# Patient Record
Sex: FEMALE | Race: ASIAN | NOT HISPANIC OR LATINO | URBAN - METROPOLITAN AREA
[De-identification: names, ages, dates, MRNs, and addresses within clinical notes are randomized per-mention and may not be internally consistent; named-entity substitution may affect disease eponyms.]

---

## 2019-05-24 ENCOUNTER — INPATIENT (INPATIENT)
Facility: HOSPITAL | Age: 59
LOS: 0 days | Discharge: HOME | End: 2019-05-25
Attending: INTERNAL MEDICINE | Admitting: INTERNAL MEDICINE
Payer: MEDICAID

## 2019-05-24 VITALS
DIASTOLIC BLOOD PRESSURE: 92 MMHG | HEART RATE: 73 BPM | RESPIRATION RATE: 16 BRPM | OXYGEN SATURATION: 100 % | TEMPERATURE: 98 F | SYSTOLIC BLOOD PRESSURE: 148 MMHG

## 2019-05-24 DIAGNOSIS — R07.9 CHEST PAIN, UNSPECIFIED: ICD-10-CM

## 2019-05-24 DIAGNOSIS — Z98.891 HISTORY OF UTERINE SCAR FROM PREVIOUS SURGERY: Chronic | ICD-10-CM

## 2019-05-24 DIAGNOSIS — Z90.11 ACQUIRED ABSENCE OF RIGHT BREAST AND NIPPLE: Chronic | ICD-10-CM

## 2019-05-24 LAB
ALBUMIN SERPL ELPH-MCNC: 4.8 G/DL — SIGNIFICANT CHANGE UP (ref 3.3–5)
ALP SERPL-CCNC: 52 U/L — SIGNIFICANT CHANGE UP (ref 40–120)
ALT FLD-CCNC: 14 U/L — SIGNIFICANT CHANGE UP (ref 4–33)
ANION GAP SERPL CALC-SCNC: 13 MMO/L — SIGNIFICANT CHANGE UP (ref 7–14)
APTT BLD: 33.5 SEC — SIGNIFICANT CHANGE UP (ref 27.5–36.3)
AST SERPL-CCNC: 30 U/L — SIGNIFICANT CHANGE UP (ref 4–32)
BASOPHILS # BLD AUTO: 0.05 K/UL — SIGNIFICANT CHANGE UP (ref 0–0.2)
BASOPHILS NFR BLD AUTO: 0.9 % — SIGNIFICANT CHANGE UP (ref 0–2)
BILIRUB SERPL-MCNC: < 0.2 MG/DL — LOW (ref 0.2–1.2)
BLD GP AB SCN SERPL QL: NEGATIVE — SIGNIFICANT CHANGE UP
BUN SERPL-MCNC: 14 MG/DL — SIGNIFICANT CHANGE UP (ref 7–23)
CALCIUM SERPL-MCNC: 10.3 MG/DL — SIGNIFICANT CHANGE UP (ref 8.4–10.5)
CHLORIDE SERPL-SCNC: 100 MMOL/L — SIGNIFICANT CHANGE UP (ref 98–107)
CK MB BLD-MCNC: 2.37 NG/ML — SIGNIFICANT CHANGE UP (ref 1–4.7)
CK MB BLD-MCNC: SIGNIFICANT CHANGE UP (ref 0–2.5)
CK SERPL-CCNC: 123 U/L — SIGNIFICANT CHANGE UP (ref 25–170)
CO2 SERPL-SCNC: 26 MMOL/L — SIGNIFICANT CHANGE UP (ref 22–31)
CREAT SERPL-MCNC: 0.65 MG/DL — SIGNIFICANT CHANGE UP (ref 0.5–1.3)
EOSINOPHIL # BLD AUTO: 0.27 K/UL — SIGNIFICANT CHANGE UP (ref 0–0.5)
EOSINOPHIL NFR BLD AUTO: 4.8 % — SIGNIFICANT CHANGE UP (ref 0–6)
GLUCOSE SERPL-MCNC: 91 MG/DL — SIGNIFICANT CHANGE UP (ref 70–99)
HCT VFR BLD CALC: 43.1 % — SIGNIFICANT CHANGE UP (ref 34.5–45)
HGB BLD-MCNC: 14.4 G/DL — SIGNIFICANT CHANGE UP (ref 11.5–15.5)
IMM GRANULOCYTES NFR BLD AUTO: 0 % — SIGNIFICANT CHANGE UP (ref 0–1.5)
INR BLD: 0.95 — SIGNIFICANT CHANGE UP (ref 0.88–1.17)
LYMPHOCYTES # BLD AUTO: 2.22 K/UL — SIGNIFICANT CHANGE UP (ref 1–3.3)
LYMPHOCYTES # BLD AUTO: 39.2 % — SIGNIFICANT CHANGE UP (ref 13–44)
MCHC RBC-ENTMCNC: 30.1 PG — SIGNIFICANT CHANGE UP (ref 27–34)
MCHC RBC-ENTMCNC: 33.4 % — SIGNIFICANT CHANGE UP (ref 32–36)
MCV RBC AUTO: 90.2 FL — SIGNIFICANT CHANGE UP (ref 80–100)
MONOCYTES # BLD AUTO: 0.51 K/UL — SIGNIFICANT CHANGE UP (ref 0–0.9)
MONOCYTES NFR BLD AUTO: 9 % — SIGNIFICANT CHANGE UP (ref 2–14)
NEUTROPHILS # BLD AUTO: 2.61 K/UL — SIGNIFICANT CHANGE UP (ref 1.8–7.4)
NEUTROPHILS NFR BLD AUTO: 46.1 % — SIGNIFICANT CHANGE UP (ref 43–77)
NRBC # FLD: 0 K/UL — SIGNIFICANT CHANGE UP (ref 0–0)
PLATELET # BLD AUTO: 260 K/UL — SIGNIFICANT CHANGE UP (ref 150–400)
PMV BLD: 10.1 FL — SIGNIFICANT CHANGE UP (ref 7–13)
POTASSIUM SERPL-MCNC: 3.9 MMOL/L — SIGNIFICANT CHANGE UP (ref 3.5–5.3)
POTASSIUM SERPL-SCNC: 3.9 MMOL/L — SIGNIFICANT CHANGE UP (ref 3.5–5.3)
PROT SERPL-MCNC: 7.9 G/DL — SIGNIFICANT CHANGE UP (ref 6–8.3)
PROTHROM AB SERPL-ACNC: 10.8 SEC — SIGNIFICANT CHANGE UP (ref 9.8–13.1)
RBC # BLD: 4.78 M/UL — SIGNIFICANT CHANGE UP (ref 3.8–5.2)
RBC # FLD: 12.2 % — SIGNIFICANT CHANGE UP (ref 10.3–14.5)
RH IG SCN BLD-IMP: POSITIVE — SIGNIFICANT CHANGE UP
SODIUM SERPL-SCNC: 139 MMOL/L — SIGNIFICANT CHANGE UP (ref 135–145)
TROPONIN T, HIGH SENSITIVITY: 26 NG/L — SIGNIFICANT CHANGE UP (ref ?–14)
TSH SERPL-MCNC: 4.67 UIU/ML — HIGH (ref 0.27–4.2)
WBC # BLD: 5.66 K/UL — SIGNIFICANT CHANGE UP (ref 3.8–10.5)
WBC # FLD AUTO: 5.66 K/UL — SIGNIFICANT CHANGE UP (ref 3.8–10.5)

## 2019-05-24 PROCEDURE — 99152 MOD SED SAME PHYS/QHP 5/>YRS: CPT

## 2019-05-24 PROCEDURE — 93458 L HRT ARTERY/VENTRICLE ANGIO: CPT | Mod: 26

## 2019-05-24 PROCEDURE — 71046 X-RAY EXAM CHEST 2 VIEWS: CPT | Mod: 26

## 2019-05-24 RX ORDER — LEVOTHYROXINE SODIUM 125 MCG
75 TABLET ORAL DAILY
Refills: 0 | Status: DISCONTINUED | OUTPATIENT
Start: 2019-05-24 | End: 2019-05-25

## 2019-05-24 RX ORDER — ONDANSETRON 8 MG/1
4 TABLET, FILM COATED ORAL ONCE
Refills: 0 | Status: DISCONTINUED | OUTPATIENT
Start: 2019-05-24 | End: 2019-05-25

## 2019-05-24 RX ORDER — ASPIRIN/CALCIUM CARB/MAGNESIUM 324 MG
324 TABLET ORAL ONCE
Refills: 0 | Status: COMPLETED | OUTPATIENT
Start: 2019-05-24 | End: 2019-05-24

## 2019-05-24 RX ORDER — METOPROLOL TARTRATE 50 MG
12.5 TABLET ORAL
Refills: 0 | Status: DISCONTINUED | OUTPATIENT
Start: 2019-05-24 | End: 2019-05-25

## 2019-05-24 RX ADMIN — Medication 324 MILLIGRAM(S): at 17:20

## 2019-05-24 NOTE — H&P CARDIOLOGY - HISTORY OF PRESENT ILLNESS
59 year old Pashto speaking female with fmaily hisory of CAD with breast CA with chemotherapy 5yrs ago, osteoporosis who to her PMD complaining of increasing indigestion over the past 4months.  Patient describes her symptoms as epigastric/substernal tightness and palpitations at rest, relieved spontaneously. Vague complaints of SOB, unable to elaborate. Denies dizziness, syncope, edema, orthopnea, PND. Underwent an EKG with reported Twave inversions and directed to [facility] ED.   In light of patients cardiac risk factors, symptoms and abnormal noninvasive test findings there is high suspicion for CAD. Patient is now referred to [facility] for a cardiac catheterization with possible PTCA/stent.

## 2019-05-24 NOTE — ED PROVIDER NOTE - ATTENDING CONTRIBUTION TO CARE
Dr. Lou: I have personally seen and examined this patient at the bedside. I have fully participated in the care of this patient. I have reviewed all pertinent clinical information, including history, physical exam, plan and the Resident's note and agree except as noted. HPI above as by me. PE above as by me. DDX unstable angina PLAN asa, pre ops, discuss with interventional cardiology.

## 2019-05-24 NOTE — H&P CARDIOLOGY - FAMILY HISTORY
Sibling  Still living? Yes, Estimated age: Age Unknown  Family history of heart attack, Age at diagnosis: Age Unknown     Father  Still living? Unknown  Family history of stroke, Age at diagnosis: Age Unknown

## 2019-05-24 NOTE — ED PROVIDER NOTE - OBJECTIVE STATEMENT
17:10 Dasha att: 59F c/o cp x 1 day, sent by PMD for abnl ekg.     IMM PMH PSH MED ALL SMOKE PCP PHARMACY 17:10 Dasha att: 59F c/o cp x 1 day, sent by PMD for abnl ekg.     IMM PMH PSH MED ALL SMOKE PCP PHARMACY\    Lynchy: 60yo F w/ pmhx of breast s/o chemo 5 yrs ago, 17:10 Dasha att: 59F c/o cp x 1 day, sent by PMD for abnl ekg.     IMM PMH PSH MED ALL SMOKE PCP PHARMACY\    Lynchy: 58yo F w/ pmhx of breast s/o chemo 5 yrs ago, c/o 6 months of intermittent chest pain, associated with numbness and tingling to the hands, nausea but no vomiting. Denies any fever, chills, leg swelling, recent travel, hx of DVT, or any other symptoms. Pt went to PMD today and was sent here for abnormal EKG. 17:10 Lou att: 59F c/o cp x 1 day, sent by PMD for abnl ekg. Patient reports past 6 months every morning indigestion, nausea, and dizziness. Today felt symptoms more severe. Called PCP, went to PCP office and got an EKG showing t wave inversions inferiorly. Daughter an MD spoke with Dr. Fuad Tomas who spoke with Dr. Nupur Anthony who arranged cardiac cath for today. Patient denies active cp, no prior cad, last stress/echo 5 yrs ago. PMH breast cancer      Lynchy: 58yo F w/ pmhx of breast s/o chemo 5 yrs ago, c/o 6 months of intermittent chest pain, associated with numbness and tingling to the hands, nausea but no vomiting. Denies any fever, chills, leg swelling, recent travel, hx of DVT, or any other symptoms. Pt went to PMD today and was sent here for abnormal EKG.

## 2019-05-24 NOTE — ED PROVIDER NOTE - CRITICAL CARE PROVIDED
direct patient care (not related to procedure)/additional history taking/consultation with other physicians/consult w/ pt's family directly relating to pts condition

## 2019-05-24 NOTE — CHART NOTE - NSCHARTNOTEFT_GEN_A_CORE
JAYLA RODRÍGUEZ  [id number] 59y    Patient status post cath via RFA. Dressing clean/dry/intact. Without hematoma. Pulses present. Patient resting comfortable without complaint. Will continue to  follow closely.    Vital Signs Last 24 Hrs  T(C): 36.4 (24 May 2019 20:14), Max: 36.5 (24 May 2019 15:59)  T(F): 97.6 (24 May 2019 20:14), Max: 97.7 (24 May 2019 15:59)  HR: 69 (24 May 2019 20:14) (69 - 82)  BP: 141/80 (24 May 2019 20:14) (141/80 - 148/92)  BP(mean): --  RR: 16 (24 May 2019 20:14) (16 - 20)  SpO2: 97% (24 May 2019 20:14) (97% - 100%)

## 2019-05-24 NOTE — PATIENT PROFILE ADULT - HAS THE PATIENT BEEN ADMITTED FROM SHORT TERM REHAB?
----- Message from Catherine Dobson sent at 11/6/2017 10:53 AM CST -----  Contact: Patient  Patient is returning phone call; call placed to pod, no answer.  Call Back#[phone]  Thanks    no

## 2019-05-24 NOTE — ED ADULT TRIAGE NOTE - CHIEF COMPLAINT QUOTE
Pt c/o  intermittent chest pain, "indigestion", nausea  since this am. Denies sob.  Pt sent by PCP with abnormal EKG

## 2019-05-24 NOTE — ED PROVIDER NOTE - CLINICAL SUMMARY MEDICAL DECISION MAKING FREE TEXT BOX
58yo F w/ pmhx of breast s/o chemo 5 yrs ago, c/o 6 months of intermittent chest pain, associated with numbness and tingling to the hands, nausea but no vomiting. Denies any fever, chills, leg swelling, recent travel, hx of DVT, or any other symptoms. Pt went to PMD today and was sent here for abnormal EKG. PE is unremarkable, r/o Angina vs NSTEMI

## 2019-05-24 NOTE — ED ADULT NURSE NOTE - OBJECTIVE STATEMENT
60 yo female, ambulatory, presenting to ed after abnormal ekg reading at pcp office. as per family, pt sent to ED for possible cath. pt c/o intermittent indigestion to mid abdominal region for 6 months. pt also experiencing palpitation intermittently for 6 months. pt had ekg last year at pcp, and changes noted to new ekg. pt states she awakes every morning with nausea and dizziness. upon assessment, pt denies active chest pain, sob, dizzziness, n/v/d. pt denies difficulty urinating, or with BMs. no recent fever or illness. pt medicated w aspirin as ordered. pt on CM. 22g iv to left hand. pink arm band applied to right wrist s/p right side mastectomy several years ago

## 2019-05-25 ENCOUNTER — TRANSCRIPTION ENCOUNTER (OUTPATIENT)
Age: 59
End: 2019-05-25

## 2019-05-25 VITALS
RESPIRATION RATE: 18 BRPM | TEMPERATURE: 98 F | SYSTOLIC BLOOD PRESSURE: 113 MMHG | HEART RATE: 67 BPM | OXYGEN SATURATION: 99 % | DIASTOLIC BLOOD PRESSURE: 77 MMHG

## 2019-05-25 DIAGNOSIS — C50.919 MALIGNANT NEOPLASM OF UNSPECIFIED SITE OF UNSPECIFIED FEMALE BREAST: ICD-10-CM

## 2019-05-25 DIAGNOSIS — R07.2 PRECORDIAL PAIN: ICD-10-CM

## 2019-05-25 LAB
ANION GAP SERPL CALC-SCNC: 12 MMO/L — SIGNIFICANT CHANGE UP (ref 7–14)
BUN SERPL-MCNC: 13 MG/DL — SIGNIFICANT CHANGE UP (ref 7–23)
CALCIUM SERPL-MCNC: 9.4 MG/DL — SIGNIFICANT CHANGE UP (ref 8.4–10.5)
CHLORIDE SERPL-SCNC: 103 MMOL/L — SIGNIFICANT CHANGE UP (ref 98–107)
CO2 SERPL-SCNC: 25 MMOL/L — SIGNIFICANT CHANGE UP (ref 22–31)
CREAT SERPL-MCNC: 0.58 MG/DL — SIGNIFICANT CHANGE UP (ref 0.5–1.3)
GLUCOSE SERPL-MCNC: 102 MG/DL — HIGH (ref 70–99)
HCT VFR BLD CALC: 41 % — SIGNIFICANT CHANGE UP (ref 34.5–45)
HGB BLD-MCNC: 13.5 G/DL — SIGNIFICANT CHANGE UP (ref 11.5–15.5)
MAGNESIUM SERPL-MCNC: 2.2 MG/DL — SIGNIFICANT CHANGE UP (ref 1.6–2.6)
MCHC RBC-ENTMCNC: 29.7 PG — SIGNIFICANT CHANGE UP (ref 27–34)
MCHC RBC-ENTMCNC: 32.9 % — SIGNIFICANT CHANGE UP (ref 32–36)
MCV RBC AUTO: 90.3 FL — SIGNIFICANT CHANGE UP (ref 80–100)
NRBC # FLD: 0 K/UL — SIGNIFICANT CHANGE UP (ref 0–0)
PHOSPHATE SERPL-MCNC: 4.5 MG/DL — SIGNIFICANT CHANGE UP (ref 2.5–4.5)
PLATELET # BLD AUTO: 233 K/UL — SIGNIFICANT CHANGE UP (ref 150–400)
PMV BLD: 10.3 FL — SIGNIFICANT CHANGE UP (ref 7–13)
POTASSIUM SERPL-MCNC: 4 MMOL/L — SIGNIFICANT CHANGE UP (ref 3.5–5.3)
POTASSIUM SERPL-SCNC: 4 MMOL/L — SIGNIFICANT CHANGE UP (ref 3.5–5.3)
RBC # BLD: 4.54 M/UL — SIGNIFICANT CHANGE UP (ref 3.8–5.2)
RBC # FLD: 12.5 % — SIGNIFICANT CHANGE UP (ref 10.3–14.5)
SODIUM SERPL-SCNC: 140 MMOL/L — SIGNIFICANT CHANGE UP (ref 135–145)
WBC # BLD: 5.37 K/UL — SIGNIFICANT CHANGE UP (ref 3.8–10.5)
WBC # FLD AUTO: 5.37 K/UL — SIGNIFICANT CHANGE UP (ref 3.8–10.5)

## 2019-05-25 RX ORDER — METOPROLOL TARTRATE 50 MG
25 TABLET ORAL DAILY
Refills: 0 | Status: DISCONTINUED | OUTPATIENT
Start: 2019-05-25 | End: 2019-05-25

## 2019-05-25 RX ORDER — LISINOPRIL 2.5 MG/1
1 TABLET ORAL
Qty: 30 | Refills: 0
Start: 2019-05-25 | End: 2019-06-23

## 2019-05-25 RX ORDER — LISINOPRIL 2.5 MG/1
2.5 TABLET ORAL DAILY
Refills: 0 | Status: DISCONTINUED | OUTPATIENT
Start: 2019-05-25 | End: 2019-05-25

## 2019-05-25 RX ORDER — METOPROLOL TARTRATE 50 MG
1 TABLET ORAL
Qty: 30 | Refills: 0
Start: 2019-05-25 | End: 2019-06-23

## 2019-05-25 RX ADMIN — Medication 75 MICROGRAM(S): at 05:42

## 2019-05-25 RX ADMIN — Medication 12.5 MILLIGRAM(S): at 05:42

## 2019-05-25 NOTE — PROGRESS NOTE ADULT - ATTENDING COMMENTS
Patient was seen and examined,interim events noted,labs and radiology studies reviewed.  Fuad Tomas MD,FACC.  [address] [place] [address].  [facility].  [phone]

## 2019-05-25 NOTE — DISCHARGE NOTE PROVIDER - NSDCCPTREATMENT_GEN_ALL_CORE_FT
PRINCIPAL PROCEDURE  Procedure: Left heart cardiac catheterization  Findings and Treatment: Normal coronary arteries. EF 50%.

## 2019-05-25 NOTE — DISCHARGE NOTE NURSING/CASE MANAGEMENT/SOCIAL WORK - NSDCDPATPORTLINK_GEN_ALL_CORE
You can access the [facility] Patient Portal, offered by [facility], by registering with the following website: [email or web address]

## 2019-05-25 NOTE — DISCHARGE NOTE PROVIDER - HOSPITAL COURSE
60 y/o female with a PMHx of breast cancer presents to ED with epigastric pain. EKG showed NSR with new T wave inversions. Troponin level 26. Pt underwent cardiac cath which revealed normal coronary arteries. EF 50%. Case discussed with Dr. Tomas, pt medically stable for discharge home.

## 2019-05-25 NOTE — DISCHARGE NOTE PROVIDER - CARE PROVIDER_API CALL
Fuad Tomas)  Medicine  Dept Director  [address] [place], [place] [address]  Phone: [phone]  Fax: [phone]  Follow Up Time:

## 2019-05-25 NOTE — DISCHARGE NOTE PROVIDER - NSDCCPCAREPLAN_GEN_ALL_CORE_FT
PRINCIPAL DISCHARGE DIAGNOSIS  Diagnosis: Chest pain  Assessment and Plan of Treatment: Your cardiac cath was normal. There were no blockages in your heart. Follow up with your cardiologist within one week of discharge.

## 2019-05-25 NOTE — PROGRESS NOTE ADULT - PROBLEM SELECTOR PLAN 1
Chest pain with abnormal ECG.  Non obstructive CAD EF-50%  Apical akinesis-Hx ChemoRx?chemo induced.  Continue BBlocker low dose ACEI.  Discharge home today

## 2019-05-25 NOTE — PROGRESS NOTE ADULT - SUBJECTIVE AND OBJECTIVE BOX
PRESENTING CC:Chest Pain    SUBJ: 59 year old Maori speaking female with fmaily hisory of CAD with breast CA with chemotherapy 5yrs ago, osteoporosis who to her PMD complaining of increasing indigestion over the past 4months.Underwent an EKG with new T Wave abnormalities underwent Cath-Non obstructive CAD with apical akinesis preserved EF-remains chest pain free no overnight events noted.    PMH -reviewed admission note, no change since admission  Heart failure: acute [ ] chronic [ ] acute or chronic [ ] diastolic [ ] systolic [ ] combined systolic and diastolic[ ]  JAN: ATN[ ] renal medullary necrosis [ ] CKD I [ ]CKDII [ ]CKD III [ ]CKD IV [ ]CKD V [ ]Other pathological lesions [ ]    MEDICATIONS  (STANDING):  levothyroxine 75 MICROGram(s) Oral daily  metoprolol tartrate 12.5 milliGRAM(s) Oral two times a day  ondansetron Injectable 4 milliGRAM(s) IV Push once      FAMILY HISTORY:  Family history of stroke (Father): father  MI 60yo  Family history of heart attack (Sibling): sister at 59yo    REVIEW OF SYSTEMS:  Constitutional: [ ] fever, [ ]weight loss,  [ ]fatigue  Eyes: [ ] visual changes  Respiratory: [ ]shortness of breath;  [ ] cough, [ ]wheezing, [ ]chills, [ ]hemoptysis  Cardiovascular: [x ] chest pain, [ ]palpitations, [ ]dizziness,  [ ]leg swelling[ ]orthopnea[ ]PND  Gastrointestinal: [ ] abdominal pain, [ ]nausea, [ ]vomiting,  [ ]diarrhea   Genitourinary: [ ] dysuria, [ ] hematuria  Neurologic: [ ] headaches [ ] tremors[ ]weakness  Skin: [ ] itching, [ ]burning, [ ] rashes  Endocrine: [ ] heat or cold intolerance  Musculoskeletal: [ ] joint pain or swelling; [ ] muscle, back, or extremity pain  Psychiatric: [ ] depression, [ ]anxiety, [ ]mood swings, or [ ]difficulty sleeping  Hematologic: [ ] easy bruising, [ ] bleeding gums    [x] All remaining systems negative except as per above.   [ ]Unable to obtain.    Vital Signs Last 24 Hrs  T(C): 36.4 (25 May 2019 05:40), Max: 36.5 (24 May 2019 15:59)  T(F): 97.6 (25 May 2019 05:40), Max: 97.7 (24 May 2019 15:59)  HR: 67 (25 May 2019 05:40) (67 - 82)  BP: 110/61 (25 May 2019 05:40) (110/61 - 148/92)  RR: 16 (25 May 2019 05:40) (16 - 20)  SpO2: 99% (25 May 2019 05:40) (97% - 100%)  I&O's Summary      PHYSICAL EXAM:  General: No acute distress BMI-18.8  HEENT: EOMI, PERRL  Neck: Supple, [ ] JVD Right Mastectomy  Lungs: Equal air entry bilaterally; [ ] rales [ ] wheezing [ ] rhonchi  Heart: Regular rate and rhythm; [x ] murmur  2 /6 [x ] systolic [ ] diastolic [ ] radiation[ ] rubs [ ]  gallops  Abdomen: Nontender, bowel sounds present  Extremities: No clubbing, cyanosis, [ ] edema  Nervous system:  Alert & Oriented X3, no focal deficits  Psychiatric: Normal affect  Skin: No rashes or lesions    LABS:      139  |  100  |  14  ----------------------------<  91  3.9   |  26  |  0.65    Ca    10.3      24 May 2019 17:06    TPro  7.9  /  Alb  4.8  /  TBili  < 0.2<L>  /  DBili  x   /  AST  30  /  ALT  14  /  AlkPhos  52      Creatinine Trend: 0.65<--                        14.4   5.66  )-----------( 260      ( 24 May 2019 17:06 )             43.1     PT/INR - ( 24 May 2019 17:06 )   PT: 10.8 SEC;   INR: 0.95          PTT - ( 24 May 2019 17:06 )  PTT:33.5 SEC    Cardiac Enzymes: CARDIAC MARKERS ( 24 May 2019 17:06 )  x     / x     / 123 u/L / 2.37 ng/mL / x    ECG [my interpretation]:Sinus rhythm anterolateral T wave abnormality    TELEMETRY:Sinus rhythm no arrhythmias    CATH:-Mold non obstructive CAD EF 50% Apical akinesis    IMPRESSION AND PLAN:

## 2019-06-06 ENCOUNTER — APPOINTMENT (OUTPATIENT)
Dept: CARDIOLOGY | Facility: CLINIC | Age: 59
End: 2019-06-06
Payer: MEDICAID

## 2019-06-06 VITALS — HEART RATE: 82 BPM | SYSTOLIC BLOOD PRESSURE: 121 MMHG | RESPIRATION RATE: 16 BRPM | DIASTOLIC BLOOD PRESSURE: 80 MMHG

## 2019-06-06 DIAGNOSIS — R94.31 ABNORMAL ELECTROCARDIOGRAM [ECG] [EKG]: ICD-10-CM

## 2019-06-06 DIAGNOSIS — I10 ESSENTIAL (PRIMARY) HYPERTENSION: ICD-10-CM

## 2019-06-06 PROCEDURE — 99204 OFFICE O/P NEW MOD 45 MIN: CPT

## 2019-06-06 RX ORDER — DOCUSATE SODIUM 100 MG/1
100 CAPSULE ORAL
Qty: 90 | Refills: 0 | Status: ACTIVE | COMMUNITY
Start: 2019-05-28

## 2019-06-06 RX ORDER — CALCIUM CARBONATE/VITAMIN D3 500 MG-2.5
500-100 TABLET,CHEWABLE ORAL
Qty: 60 | Refills: 0 | Status: ACTIVE | COMMUNITY
Start: 2019-05-24

## 2019-06-06 RX ORDER — METOPROLOL SUCCINATE 25 MG/1
25 TABLET, EXTENDED RELEASE ORAL
Qty: 30 | Refills: 0 | Status: DISCONTINUED | COMMUNITY
Start: 2019-05-25 | End: 2019-06-06

## 2019-06-06 RX ORDER — DENOSUMAB 60 MG/ML
60 INJECTION SUBCUTANEOUS
Qty: 1 | Refills: 0 | Status: ACTIVE | COMMUNITY
Start: 2019-06-05

## 2019-06-06 RX ORDER — LISINOPRIL 2.5 MG/1
2.5 TABLET ORAL
Qty: 30 | Refills: 0 | Status: DISCONTINUED | COMMUNITY
Start: 2019-05-25 | End: 2019-06-06

## 2019-06-06 RX ORDER — CYCLOSPORINE 0.5 MG/ML
0.05 EMULSION OPHTHALMIC
Qty: 60 | Refills: 0 | Status: ACTIVE | COMMUNITY
Start: 2019-05-23

## 2019-06-06 RX ORDER — LEVOTHYROXINE SODIUM 75 UG/1
75 TABLET ORAL
Qty: 30 | Refills: 0 | Status: ACTIVE | COMMUNITY
Start: 2019-05-01

## 2019-06-06 NOTE — DISCUSSION/SUMMARY
[FreeTextEntry1] : 59 F hypothyroidism with CP and wall motion abnormality on cath.\par CHECK ECHO.\par Continue synthroid.\par STOP LISINOPRIL.\par Change metoprolol XL to metoprolol tartrate 12.5 BID. Patient reports SE with Toprol XL.

## 2019-06-06 NOTE — PHYSICAL EXAM
[General Appearance - Well Developed] : well developed [Normal Appearance] : normal appearance [General Appearance - Well Nourished] : well nourished [Well Groomed] : well groomed [No Deformities] : no deformities [Eyelids - No Xanthelasma] : the eyelids demonstrated no xanthelasmas [Normal Conjunctiva] : the conjunctiva exhibited no abnormalities [General Appearance - In No Acute Distress] : no acute distress [Normal Oral Mucosa] : normal oral mucosa [No Oral Pallor] : no oral pallor [Normal Jugular Venous V Waves Present] : normal jugular venous V waves present [No Oral Cyanosis] : no oral cyanosis [Normal Jugular Venous A Waves Present] : normal jugular venous A waves present [Heart Rate And Rhythm] : heart rate and rhythm were normal [No Jugular Venous Rousseau A Waves] : no jugular venous rousseau A waves [Heart Sounds] : normal S1 and S2 [Murmurs] : no murmurs present [Respiration, Rhythm And Depth] : normal respiratory rhythm and effort [Exaggerated Use Of Accessory Muscles For Inspiration] : no accessory muscle use [Auscultation Breath Sounds / Voice Sounds] : lungs were clear to auscultation bilaterally [Abdomen Tenderness] : non-tender [Abdomen Soft] : soft [Abdomen Mass (___ Cm)] : no abdominal mass palpated [Cyanosis, Localized] : no localized cyanosis [Nail Clubbing] : no clubbing of the fingernails [Petechial Hemorrhages (___cm)] : no petechial hemorrhages [Skin Color & Pigmentation] : normal skin color and pigmentation [Skin Lesions] : no skin lesions [No Venous Stasis] : no venous stasis [] : no rash [No Xanthoma] : no  xanthoma was observed [Oriented To Time, Place, And Person] : oriented to person, place, and time [No Skin Ulcers] : no skin ulcer [Affect] : the affect was normal [Mood] : the mood was normal [No Anxiety] : not feeling anxious

## 2019-06-06 NOTE — REASON FOR VISIT
[Initial Evaluation] : an initial evaluation of [Chest Pain] : chest pain [Hypertension] : hypertension [FreeTextEntry1] : 59 F hypothyroidism hx of breast CA with CP.

## 2019-06-06 NOTE — HISTORY OF PRESENT ILLNESS
[FreeTextEntry1] : 1. Hypothyroidism on synthroid.\par 2. Patient recently was found to have an abnormal EKG (Tw inversions in the anterior leads) with CP. CP is non-exertional, without radiation, lasting minutes and increasing in severity but relieved on its own and non-exertional in nature. The CP occurs several times/ week.\par She denies SOB, leg edema or any other symptoms. ET is preserved.

## 2019-06-20 ENCOUNTER — APPOINTMENT (OUTPATIENT)
Dept: CARDIOLOGY | Facility: CLINIC | Age: 59
End: 2019-06-20
Payer: MEDICAID

## 2019-06-20 VITALS
BODY MASS INDEX: 18.25 KG/M2 | RESPIRATION RATE: 15 BRPM | WEIGHT: 103 LBS | SYSTOLIC BLOOD PRESSURE: 100 MMHG | DIASTOLIC BLOOD PRESSURE: 62 MMHG | HEART RATE: 75 BPM | HEIGHT: 63 IN

## 2019-06-20 PROCEDURE — 99214 OFFICE O/P EST MOD 30 MIN: CPT

## 2019-06-20 NOTE — REASON FOR VISIT
[Follow-Up - Clinic] : a clinic follow-up of [Chest Pain] : chest pain [Palpitations] : palpitations [FreeTextEntry1] : 59 F hypothyroidism hx of breast CA with CP. \par

## 2019-06-20 NOTE — HISTORY OF PRESENT ILLNESS
[FreeTextEntry1] : 1. Hypothyroidism on synthroid.\par 2. \par She underwent cardiac cath that showed normal coronaries but was told she had hypokinesis on the LV gram. Echo shows normal wall motion.\par She reports improvement in her CP and SOB. She has had intermittent palpitations on mornings only. She is on metoprolol 12.5 BID. \par

## 2019-06-20 NOTE — PHYSICAL EXAM
[Normal Appearance] : normal appearance [General Appearance - Well Developed] : well developed [Well Groomed] : well groomed [No Deformities] : no deformities [General Appearance - Well Nourished] : well nourished [General Appearance - In No Acute Distress] : no acute distress [Normal Conjunctiva] : the conjunctiva exhibited no abnormalities [Eyelids - No Xanthelasma] : the eyelids demonstrated no xanthelasmas [No Oral Pallor] : no oral pallor [Normal Oral Mucosa] : normal oral mucosa [No Oral Cyanosis] : no oral cyanosis [Normal Jugular Venous A Waves Present] : normal jugular venous A waves present [Normal Jugular Venous V Waves Present] : normal jugular venous V waves present [No Jugular Venous Rousseau A Waves] : no jugular venous rousseau A waves [Respiration, Rhythm And Depth] : normal respiratory rhythm and effort [Exaggerated Use Of Accessory Muscles For Inspiration] : no accessory muscle use [Auscultation Breath Sounds / Voice Sounds] : lungs were clear to auscultation bilaterally [Heart Rate And Rhythm] : heart rate and rhythm were normal [Heart Sounds] : normal S1 and S2 [Murmurs] : no murmurs present [Abdomen Soft] : soft [Abdomen Tenderness] : non-tender [Abdomen Mass (___ Cm)] : no abdominal mass palpated [Nail Clubbing] : no clubbing of the fingernails [Cyanosis, Localized] : no localized cyanosis [Petechial Hemorrhages (___cm)] : no petechial hemorrhages [Skin Color & Pigmentation] : normal skin color and pigmentation [] : no rash [No Venous Stasis] : no venous stasis [Skin Lesions] : no skin lesions [No Skin Ulcers] : no skin ulcer [No Xanthoma] : no  xanthoma was observed [Oriented To Time, Place, And Person] : oriented to person, place, and time [Affect] : the affect was normal [Mood] : the mood was normal [No Anxiety] : not feeling anxious

## 2019-06-20 NOTE — DISCUSSION/SUMMARY
[FreeTextEntry1] : 59 F for f/u of CP and abnormal EKG.\par Normal wall motion on echo.\par No evidence of CAD.\par Reassurance and f/u with palpitations continue for holter.

## 2019-07-25 ENCOUNTER — APPOINTMENT (OUTPATIENT)
Dept: CARDIOLOGY | Facility: CLINIC | Age: 59
End: 2019-07-25
Payer: MEDICAID

## 2019-07-25 VITALS
BODY MASS INDEX: 18.07 KG/M2 | SYSTOLIC BLOOD PRESSURE: 103 MMHG | HEIGHT: 63 IN | RESPIRATION RATE: 12 BRPM | WEIGHT: 102 LBS | HEART RATE: 67 BPM | DIASTOLIC BLOOD PRESSURE: 80 MMHG

## 2019-07-25 PROCEDURE — 99213 OFFICE O/P EST LOW 20 MIN: CPT

## 2019-07-25 RX ORDER — METOPROLOL TARTRATE 25 MG/1
25 TABLET, FILM COATED ORAL
Qty: 60 | Refills: 5 | Status: ACTIVE | COMMUNITY
Start: 2019-06-06 | End: 1900-01-01

## 2019-07-25 NOTE — HISTORY OF PRESENT ILLNESS
[FreeTextEntry1] : 1. Hypothyroidism on synthroid. No palpitations noted. \par 2. \par She underwent cardiac cath that showed normal coronaries but was told she had hypokinesis on the LV gram. Echo shows normal wall motion.\par \par Her symptoms have improved on BB. ET is stable.

## 2019-07-25 NOTE — REASON FOR VISIT
[Follow-Up - Clinic] : a clinic follow-up of [FreeTextEntry1] : 59 F hypothyroidism hx of breast CA with CP. \par

## 2019-07-25 NOTE — DISCUSSION/SUMMARY
[FreeTextEntry1] : 59 F hypothyroidism abnormal EKG for f/u.\par Patient asymptomatic. \par last echo normal.\par Continue meds for hypothyroidism.

## 2019-07-25 NOTE — PHYSICAL EXAM
[General Appearance - Well Developed] : well developed [Normal Appearance] : normal appearance [Well Groomed] : well groomed [General Appearance - Well Nourished] : well nourished [No Deformities] : no deformities [General Appearance - In No Acute Distress] : no acute distress [Normal Conjunctiva] : the conjunctiva exhibited no abnormalities [Eyelids - No Xanthelasma] : the eyelids demonstrated no xanthelasmas [Normal Oral Mucosa] : normal oral mucosa [No Oral Pallor] : no oral pallor [No Oral Cyanosis] : no oral cyanosis [Normal Jugular Venous A Waves Present] : normal jugular venous A waves present [Normal Jugular Venous V Waves Present] : normal jugular venous V waves present [No Jugular Venous Rousseau A Waves] : no jugular venous rousseau A waves [Respiration, Rhythm And Depth] : normal respiratory rhythm and effort [Exaggerated Use Of Accessory Muscles For Inspiration] : no accessory muscle use [Auscultation Breath Sounds / Voice Sounds] : lungs were clear to auscultation bilaterally [Heart Rate And Rhythm] : heart rate and rhythm were normal [Heart Sounds] : normal S1 and S2 [Murmurs] : no murmurs present [Abdomen Soft] : soft [Abdomen Tenderness] : non-tender [Abdomen Mass (___ Cm)] : no abdominal mass palpated [Nail Clubbing] : no clubbing of the fingernails [Cyanosis, Localized] : no localized cyanosis [Petechial Hemorrhages (___cm)] : no petechial hemorrhages [Skin Color & Pigmentation] : normal skin color and pigmentation [] : no rash [No Venous Stasis] : no venous stasis [Skin Lesions] : no skin lesions [No Skin Ulcers] : no skin ulcer [No Xanthoma] : no  xanthoma was observed [Oriented To Time, Place, And Person] : oriented to person, place, and time [Affect] : the affect was normal [Mood] : the mood was normal [No Anxiety] : not feeling anxious

## 2019-09-04 ENCOUNTER — APPOINTMENT (OUTPATIENT)
Dept: OBGYN | Facility: CLINIC | Age: 59
End: 2019-09-04

## 2019-11-21 ENCOUNTER — APPOINTMENT (OUTPATIENT)
Dept: CARDIOLOGY | Facility: CLINIC | Age: 59
End: 2019-11-21

## 2021-07-10 ENCOUNTER — TRANSCRIPTION ENCOUNTER (OUTPATIENT)
Age: 61
End: 2021-07-10

## 2022-04-07 ENCOUNTER — EMERGENCY (EMERGENCY)
Facility: HOSPITAL | Age: 62
LOS: 1 days | Discharge: HOME | End: 2022-04-07
Attending: EMERGENCY MEDICINE | Admitting: EMERGENCY MEDICINE
Payer: MEDICAID

## 2022-04-07 VITALS
OXYGEN SATURATION: 100 % | DIASTOLIC BLOOD PRESSURE: 86 MMHG | SYSTOLIC BLOOD PRESSURE: 133 MMHG | RESPIRATION RATE: 16 BRPM | HEART RATE: 75 BPM | HEIGHT: 63 IN | TEMPERATURE: 99 F

## 2022-04-07 DIAGNOSIS — Z98.891 HISTORY OF UTERINE SCAR FROM PREVIOUS SURGERY: Chronic | ICD-10-CM

## 2022-04-07 DIAGNOSIS — Z90.11 ACQUIRED ABSENCE OF RIGHT BREAST AND NIPPLE: Chronic | ICD-10-CM

## 2022-04-07 LAB
BASOPHILS # BLD AUTO: 0.03 K/UL — SIGNIFICANT CHANGE UP (ref 0–0.2)
BASOPHILS NFR BLD AUTO: 0.7 % — SIGNIFICANT CHANGE UP (ref 0–2)
EOSINOPHIL # BLD AUTO: 0.27 K/UL — SIGNIFICANT CHANGE UP (ref 0–0.5)
EOSINOPHIL NFR BLD AUTO: 6.1 % — HIGH (ref 0–6)
HCT VFR BLD CALC: 39.2 % — SIGNIFICANT CHANGE UP (ref 34.5–45)
HGB BLD-MCNC: 13.1 G/DL — SIGNIFICANT CHANGE UP (ref 11.5–15.5)
IANC: 1.83 K/UL — SIGNIFICANT CHANGE UP (ref 1.8–7.4)
IMM GRANULOCYTES NFR BLD AUTO: 0.2 % — SIGNIFICANT CHANGE UP (ref 0–1.5)
LYMPHOCYTES # BLD AUTO: 1.91 K/UL — SIGNIFICANT CHANGE UP (ref 1–3.3)
LYMPHOCYTES # BLD AUTO: 42.8 % — SIGNIFICANT CHANGE UP (ref 13–44)
MCHC RBC-ENTMCNC: 30.7 PG — SIGNIFICANT CHANGE UP (ref 27–34)
MCHC RBC-ENTMCNC: 33.4 GM/DL — SIGNIFICANT CHANGE UP (ref 32–36)
MCV RBC AUTO: 91.8 FL — SIGNIFICANT CHANGE UP (ref 80–100)
MONOCYTES # BLD AUTO: 0.41 K/UL — SIGNIFICANT CHANGE UP (ref 0–0.9)
MONOCYTES NFR BLD AUTO: 9.2 % — SIGNIFICANT CHANGE UP (ref 2–14)
NEUTROPHILS # BLD AUTO: 1.83 K/UL — SIGNIFICANT CHANGE UP (ref 1.8–7.4)
NEUTROPHILS NFR BLD AUTO: 41 % — LOW (ref 43–77)
NRBC # BLD: 0 /100 WBCS — SIGNIFICANT CHANGE UP
NRBC # FLD: 0 K/UL — SIGNIFICANT CHANGE UP
PLATELET # BLD AUTO: 231 K/UL — SIGNIFICANT CHANGE UP (ref 150–400)
RBC # BLD: 4.27 M/UL — SIGNIFICANT CHANGE UP (ref 3.8–5.2)
RBC # FLD: 12.9 % — SIGNIFICANT CHANGE UP (ref 10.3–14.5)
WBC # BLD: 4.46 K/UL — SIGNIFICANT CHANGE UP (ref 3.8–10.5)
WBC # FLD AUTO: 4.46 K/UL — SIGNIFICANT CHANGE UP (ref 3.8–10.5)

## 2022-04-07 PROCEDURE — 71046 X-RAY EXAM CHEST 2 VIEWS: CPT | Mod: 26

## 2022-04-07 PROCEDURE — 99285 EMERGENCY DEPT VISIT HI MDM: CPT | Mod: 25

## 2022-04-07 PROCEDURE — 93010 ELECTROCARDIOGRAM REPORT: CPT

## 2022-04-07 RX ORDER — SUCRALFATE 1 G
1 TABLET ORAL ONCE
Refills: 0 | Status: COMPLETED | OUTPATIENT
Start: 2022-04-07 | End: 2022-04-07

## 2022-04-07 RX ADMIN — Medication 1 GRAM(S): at 23:05

## 2022-04-07 NOTE — ED ADULT NURSE NOTE - OBJECTIVE STATEMENT
pt presents to room 8, A&0 4 ambulatory at baseline, complaining of non radiating midsternal chest pressure and headache since 3pm today. Hx of cardiac catheretization in 2018 with no stent placement. Pt coming in from urgent care for further cardiac workup. Respirations even and unlabored. Lung sounds clear with equal chest rise bilaterally. ABD is soft, non tender, non distended with normal active bowel sounds. skin warm dry and intact. No complaints of nausea, dizziness, vomiting  SOB, fever, chills verbalized. 20GL IV in Hand in place, labs sent, medicated as ordered. NSR on cardiac monitor. awaiting further orders

## 2022-04-07 NOTE — ED ADULT TRIAGE NOTE - CHIEF COMPLAINT QUOTE
Pt c/o midsternal chest pressure and headache since 3pm. Pt was cathed in 2018 with no stents needed. Pt was seen at urgent care and was told to come to ed for further work up. No complaints of chest pain, headache, nausea, dizziness, vomiting  SOB, fever, chills verbalized..

## 2022-04-08 VITALS
OXYGEN SATURATION: 100 % | TEMPERATURE: 98 F | HEART RATE: 77 BPM | DIASTOLIC BLOOD PRESSURE: 93 MMHG | RESPIRATION RATE: 16 BRPM | SYSTOLIC BLOOD PRESSURE: 129 MMHG

## 2022-04-08 LAB
ALBUMIN SERPL ELPH-MCNC: 4.2 G/DL — SIGNIFICANT CHANGE UP (ref 3.3–5)
ALP SERPL-CCNC: 50 U/L — SIGNIFICANT CHANGE UP (ref 40–120)
ALT FLD-CCNC: 19 U/L — SIGNIFICANT CHANGE UP (ref 4–33)
ANION GAP SERPL CALC-SCNC: 13 MMOL/L — SIGNIFICANT CHANGE UP (ref 7–14)
AST SERPL-CCNC: 26 U/L — SIGNIFICANT CHANGE UP (ref 4–32)
BILIRUB SERPL-MCNC: 0.2 MG/DL — SIGNIFICANT CHANGE UP (ref 0.2–1.2)
BUN SERPL-MCNC: 18 MG/DL — SIGNIFICANT CHANGE UP (ref 7–23)
CALCIUM SERPL-MCNC: 8.5 MG/DL — SIGNIFICANT CHANGE UP (ref 8.4–10.5)
CHLORIDE SERPL-SCNC: 103 MMOL/L — SIGNIFICANT CHANGE UP (ref 98–107)
CO2 SERPL-SCNC: 20 MMOL/L — LOW (ref 22–31)
CREAT SERPL-MCNC: 0.5 MG/DL — SIGNIFICANT CHANGE UP (ref 0.5–1.3)
EGFR: 105 ML/MIN/1.73M2 — SIGNIFICANT CHANGE UP
GLUCOSE SERPL-MCNC: 96 MG/DL — SIGNIFICANT CHANGE UP (ref 70–99)
LIDOCAIN IGE QN: 46 U/L — SIGNIFICANT CHANGE UP (ref 7–60)
POTASSIUM SERPL-MCNC: 4.1 MMOL/L — SIGNIFICANT CHANGE UP (ref 3.5–5.3)
POTASSIUM SERPL-SCNC: 4.1 MMOL/L — SIGNIFICANT CHANGE UP (ref 3.5–5.3)
PROT SERPL-MCNC: 6.8 G/DL — SIGNIFICANT CHANGE UP (ref 6–8.3)
SODIUM SERPL-SCNC: 136 MMOL/L — SIGNIFICANT CHANGE UP (ref 135–145)
TROPONIN T, HIGH SENSITIVITY RESULT: <6 NG/L — SIGNIFICANT CHANGE UP

## 2022-04-08 NOTE — ED PROVIDER NOTE - NS ED ROS FT
Constitutional: no fevers, chills  HEENT: no HA, vision changes, rhinorrhea, sore throat  Cardiac: +chest pain, no palpitations  Respiratory: no SOB, cough or hemoptysis  GI: no n/v/d/c, abd pain, bloody or dark stools  : no dysuria, frequency, or hematuria  MSK: no joint pain, neck pain or back pain  Skin: no rashes, jaundice, pruritis  Neuro: no numbness/tingling, weakness, unsteady gait  ros otherwise neg except per hpi

## 2022-04-08 NOTE — ED PROVIDER NOTE - OBJECTIVE STATEMENT
pt with h/o hypothyroidism and breast ca s/p R mastectomy in remission presents with nonexertional substernal/epigastric pressure-like discomfort since 1500. no radiation to neck back or jaw no diaphoresis or sob. +nausea no vomiting. sensation of "indigestion". no bloody or dark stools. no post-prandial pain. no LE edema or hormonal medications. no pleurisy. no trauma. no rashes. h/o clean cath in 2018. Was seen by urgent care clinic with NSR ECG but sent in for r/o atypical presentation of MI. non-smoker.

## 2022-04-08 NOTE — ED PROVIDER NOTE - CLINICAL SUMMARY MEDICAL DECISION MAKING FREE TEXT BOX
pt with h/o clean cath no acs risk factors presents with constant unchanging substernal pressure that is nonexertional. low heart score. r/o acs and treat for reflux. benign abd exam. no concern for biliary pathology.

## 2022-04-08 NOTE — ED PROVIDER NOTE - ATTENDING CONTRIBUTION TO CARE
Brief HPI: 63 yo F pmh hypothyroidism and breast ca s/p R mastectomy in remission presents with nonexertional substernal/epigastric pressure-like discomfort.  CP is non-radiating, non-pleuritic.  No associated sob, cough, fever, chills, nausea, vomiting, leg swelling, hemoptysis.      Vitals:   Reviewed    Exam:    GEN:  Non-toxic appearing, non-distressed, speaking full sentences, non-diaphoretic, AAOx3  HEENT:  NCAT, neck supple, EOMI, PERRLA, sclera anicteric, no conjunctival pallor or injection, no stridor, normal voice, no tonsillar exudate, uvula midline  CV:  regular rhythm and rate, s1/s2 audible, no murmurs, rubs or gallops, peripheral pulses 2+ and symmetric  PULM:  non-labored respirations, lungs clear to auscultation bilaterally, no wheezes, crackles or rales  ABD:  non distended, non-tender, no rebound, no guarding, negative David's sign, bowel sounds normal, no cvat  MSK:  no gross deformity, non-tender extremities and joints, range of motion grossly normal appearing, no extremity edema, extremities warm and well perfused   NEURO:  AAOx3, CN II-XII intact, motor 5/5 in upper and lower extremities bilaterally, sensation grossly intact in extremities and trunk  SKIN:  warm, dry, no rash or vesicles     A/P:  63 yo F pmh hypothyroidism and breast ca s/p R mastectomy in remission presents with nonexertional substernal/epigastric pressure-like discomfort.  VSS.  EKG non-ischemic.  Abdomen non-tender.  Low c/f ACS given cp non-exertional, non-radiating, no nausea or diaphoresis.  Low c/f pe or dissection.  Plan for labs, cxr, supportive care.  Dispo pending.

## 2022-04-08 NOTE — ED PROVIDER NOTE - NSFOLLOWUPINSTRUCTIONS_ED_ALL_ED_FT
You were seen in the Emergency Department for chest pain.     1) Advance activity as tolerated.   2) Continue all previously prescribed medications as directed.    3) Follow up with your primary care physician in 24-48 hours - take copies of your results.    4) Return to the Emergency Department for worsening or persistent symptoms, and/or ANY NEW OR CONCERNING SYMPTOMS.    see your cardiologist to monitor your status and repeat prognostic testing.     Chest Pain    Chest pain can be caused by many different conditions which may or may not be dangerous. Causes include heartburn, lung infections, heart attack, blood clot in lungs, skin infections, strain or damage to muscle, cartilage, or bones, etc. In addition to a history and physical examination, an electrocardiogram (ECG) or other lab tests may have been performed to determine the cause of your chest pain. Follow up with your primary care provider or with a cardiologist as instructed.     SEEK IMMEDIATE MEDICAL CARE IF YOU HAVE ANY OF THE FOLLOWING SYMPTOMS: worsening chest pain, coughing up blood, unexplained back/neck/jaw pain, severe abdominal pain, dizziness or lightheadedness, fainting, shortness of breath, sweaty or clammy skin, vomiting, or racing heart beat. These symptoms may represent a serious problem that is an emergency. Do not wait to see if the symptoms will go away. Get medical help right away. Call 911 and do not drive yourself to the hospital.

## 2022-04-08 NOTE — ED PROVIDER NOTE - NSICDXFAMILYHX_GEN_ALL_CORE_FT
FAMILY HISTORY:  Father  Still living? Unknown  Family history of stroke, Age at diagnosis: Age Unknown    Sibling  Still living? Yes, Estimated age: Age Unknown  Family history of heart attack, Age at diagnosis: Age Unknown

## 2022-04-08 NOTE — ED PROVIDER NOTE - PATIENT PORTAL LINK FT
You can access the FollowMyHealth Patient Portal offered by [facility] by registering at the following website: [email or web address]. By joining [employer]’s FollowMyHealth portal, you will also be able to view your health information using other applications (apps) compatible with our system.

## 2022-04-08 NOTE — ED PROVIDER NOTE - PHYSICAL EXAMINATION
General: non-toxic, NAD  HEENT: NCAT, PERRL no conjunctival pallor  Cardiac: RRR, no murmurs, 2+ peripheral pulses  Resp: CTAB  Abdomen: soft, non-distended, bowel sounds present, no ttp, no rebound or guarding. no organomegaly  Extremities: no peripheral edema, calf tenderness, or leg size discrepancies  Skin: no rashes  Neuro: AAOx4, 5+motor, sensation grossly intact  Psych: mood and affect appropriate

## 2023-11-22 RX ORDER — DENOSUMAB 60 MG/ML
0 INJECTION SUBCUTANEOUS
Qty: 0 | Refills: 0 | DISCHARGE

## 2023-11-22 RX ORDER — LEVOTHYROXINE SODIUM 125 MCG
1 TABLET ORAL
Qty: 0 | Refills: 0 | DISCHARGE

## 2023-12-05 ENCOUNTER — NON-APPOINTMENT (OUTPATIENT)
Age: 63
End: 2023-12-05

## 2024-06-11 ENCOUNTER — NON-APPOINTMENT (OUTPATIENT)
Age: 64
End: 2024-06-11

## 2024-06-11 PROBLEM — C50.919 MALIGNANT NEOPLASM OF UNSPECIFIED SITE OF UNSPECIFIED FEMALE BREAST: Chronic | Status: ACTIVE | Noted: 2019-05-24

## 2024-06-11 PROBLEM — M81.0 AGE-RELATED OSTEOPOROSIS WITHOUT CURRENT PATHOLOGICAL FRACTURE: Chronic | Status: ACTIVE | Noted: 2019-05-24

## 2024-07-22 ENCOUNTER — APPOINTMENT (OUTPATIENT)
Dept: HEPATOLOGY | Facility: CLINIC | Age: 64
End: 2024-07-22
Payer: MEDICARE

## 2024-07-22 VITALS
TEMPERATURE: 97.4 F | OXYGEN SATURATION: 100 % | HEIGHT: 63 IN | SYSTOLIC BLOOD PRESSURE: 144 MMHG | DIASTOLIC BLOOD PRESSURE: 83 MMHG | WEIGHT: 95 LBS | BODY MASS INDEX: 16.83 KG/M2 | HEART RATE: 79 BPM

## 2024-07-22 DIAGNOSIS — K76.0 FATTY (CHANGE OF) LIVER, NOT ELSEWHERE CLASSIFIED: ICD-10-CM

## 2024-07-22 DIAGNOSIS — R74.8 ABNORMAL LEVELS OF OTHER SERUM ENZYMES: ICD-10-CM

## 2024-07-22 DIAGNOSIS — R63.4 ABNORMAL WEIGHT LOSS: ICD-10-CM

## 2024-07-22 LAB
AFP-TM SERPL-MCNC: 3.2 NG/ML
ALBUMIN SERPL ELPH-MCNC: 4.9 G/DL
ALP BLD-CCNC: 74 U/L
ALT SERPL-CCNC: 26 U/L
ANION GAP SERPL CALC-SCNC: 15 MMOL/L
AST SERPL-CCNC: 36 U/L
BASOPHILS # BLD AUTO: 0.03 K/UL
BASOPHILS NFR BLD AUTO: 0.5 %
BILIRUB SERPL-MCNC: 0.4 MG/DL
BUN SERPL-MCNC: 16 MG/DL
CALCIUM SERPL-MCNC: 9.4 MG/DL
CERULOPLASMIN SERPL-MCNC: 29 MG/DL
CHLORIDE SERPL-SCNC: 103 MMOL/L
CHOLEST SERPL-MCNC: 223 MG/DL
CK MB BLD-MCNC: 3.1 NG/ML
CO2 SERPL-SCNC: 22 MMOL/L
CREAT SERPL-MCNC: 0.56 MG/DL
EGFR: 101 ML/MIN/1.73M2
EOSINOPHIL # BLD AUTO: 0.1 K/UL
EOSINOPHIL NFR BLD AUTO: 1.8 %
FERRITIN SERPL-MCNC: 223 NG/ML
GGT SERPL-CCNC: 35 U/L
GLUCOSE SERPL-MCNC: 82 MG/DL
HBV CORE IGG+IGM SER QL: NONREACTIVE
HBV SURFACE AB SERPL IA-ACNC: <3 MIU/ML
HBV SURFACE AG SER QL: NONREACTIVE
HCT VFR BLD CALC: 46.5 %
HCV AB SER QL: NONREACTIVE
HCV S/CO RATIO: 0.16 S/CO
HDLC SERPL-MCNC: 65 MG/DL
HEPATITIS A IGG ANTIBODY: REACTIVE
HGB BLD-MCNC: 15.4 G/DL
IGA SER QL IEP: 313 MG/DL
IMM GRANULOCYTES NFR BLD AUTO: 0.2 %
INR PPP: 0.96 RATIO
IRON SATN MFR SERPL: 29 %
IRON SERPL-MCNC: 99 UG/DL
LDLC SERPL CALC-MCNC: 142 MG/DL
LYMPHOCYTES # BLD AUTO: 2.15 K/UL
LYMPHOCYTES NFR BLD AUTO: 37.7 %
MAN DIFF?: NORMAL
MCHC RBC-ENTMCNC: 30.3 PG
MCHC RBC-ENTMCNC: 33.1 GM/DL
MCV RBC AUTO: 91.5 FL
MONOCYTES # BLD AUTO: 0.33 K/UL
MONOCYTES NFR BLD AUTO: 5.8 %
NEUTROPHILS # BLD AUTO: 3.08 K/UL
NEUTROPHILS NFR BLD AUTO: 54 %
NONHDLC SERPL-MCNC: 158 MG/DL
PLATELET # BLD AUTO: 239 K/UL
POTASSIUM SERPL-SCNC: 3.9 MMOL/L
PROT SERPL-MCNC: 8.1 G/DL
PT BLD: 10.9 SEC
RBC # BLD: 5.08 M/UL
RBC # FLD: 12.5 %
SODIUM SERPL-SCNC: 140 MMOL/L
TIBC SERPL-MCNC: 338 UG/DL
TRIGL SERPL-MCNC: 93 MG/DL
TROPONIN I SERPL-MCNC: 0.02 NG/ML
UIBC SERPL-MCNC: 238 UG/DL
WBC # FLD AUTO: 5.7 K/UL

## 2024-07-22 PROCEDURE — 99204 OFFICE O/P NEW MOD 45 MIN: CPT

## 2024-07-23 LAB
ANA PAT FLD IF-IMP: ABNORMAL
ANA SER IF-ACNC: ABNORMAL
MITOCHONDRIAL (M2) AB, SERUM: <20 UNITS

## 2024-07-24 LAB
CK BB SERPL ELPH-CCNC: 0 % (ref 0–?)
CK MB CFR SERPL ELPH: 0 %
CK MM SERPL ELPH-CCNC: 100 %
CREATINE KINASE,TOTAL,SERUM: 146 U/L
MACRO TYPE 1: 0 %
MACRO TYPE 2: 0 %

## 2024-07-26 ENCOUNTER — APPOINTMENT (OUTPATIENT)
Dept: ULTRASOUND IMAGING | Facility: CLINIC | Age: 64
End: 2024-07-26
Payer: MEDICARE

## 2024-07-26 PROCEDURE — 76700 US EXAM ABDOM COMPLETE: CPT | Mod: 26

## 2024-07-29 NOTE — HISTORY OF PRESENT ILLNESS
[FreeTextEntry1] : RFR: Elevated liver tests Referring MD: Dr. Brandee Bronson MD (Daughter), Lidia Bronson MD (Fax [phone])  Ms. Bronson is a 64y F with right breast cancer s/p mastectomy 2013 chemox6 2024 who is referred by PCP for evaluation of elevated liver enzymes. Pt also has PMHx of hypothyroidism, HLD, and Osteoporosis. Also followed by Dr. Morales Howard (cardiology) for hx of chest pain (managed w BB)-- 2019 w/u cardiac cath showed normal coronaries but was told she had hypokinesis on the LV gram. Echo shows normal wall motion.   Today she complains of poor appetite and weight loss. He denies n/v/d, fevers, chills, excessive fatigue, chest pain, SOB, palpitations, lightheadedness/ dizziness, melena, unintentional weight loss, acute hospitalizations or ER visits. Afebrile today. Appetite is good.  [Medical Hx] See HPI [Surgical Hx] mastectomy ,  [Allergies] None [FmH of liver disease] None  [Medication] Currently Synthroid 75mcg only Atorvastatin stopped 6 months ago  [Social Hx] swimming 3/week - Alcohol: Never - Tobacco: Neve - Illicit drug: Denies - Herb and dietary Supplement: Denies  [Labs] 24 AST/ALT 43/54, AlkP 60. TB 0.6, Alb 4.2, Na 138, Ca 138, SCr 0.52. Total cholesterol 237, TGL 95,  CEA < 2 TSH 2.82  [Imaging] - US Abd 23: heterogeneous, which may obscure hepatic masses.  [Procedures] EGD 2023: wnl per pt Colonoscopy: will be scheduled Dr. Fernandes at    no deformity, pain or tenderness. no restriction of movement

## 2024-07-29 NOTE — HISTORY OF PRESENT ILLNESS
[FreeTextEntry1] : RFR: Elevated liver tests Referring MD: Dr. Brandee Bronson MD (Daughter), Lidia Bronson MD (Fax [phone])  Ms. rBonson is a 64y F with right breast cancer s/p mastectomy 2013 chemox6 2024 who is referred by PCP for evaluation of elevated liver enzymes. Pt also has PMHx of hypothyroidism, HLD, and Osteoporosis. Also followed by Dr. Morales Howard (cardiology) for hx of chest pain (managed w BB)-- 2019 w/u cardiac cath showed normal coronaries but was told she had hypokinesis on the LV gram. Echo shows normal wall motion.   Today she complains of poor appetite and weight loss. He denies n/v/d, fevers, chills, excessive fatigue, chest pain, SOB, palpitations, lightheadedness/ dizziness, melena, unintentional weight loss, acute hospitalizations or ER visits. Afebrile today. Appetite is good.  [Medical Hx] See HPI [Surgical Hx] mastectomy ,  [Allergies] None [FmH of liver disease] None  [Medication] Currently Synthroid 75mcg only Atorvastatin stopped 6 months ago  [Social Hx] swimming 3/week - Alcohol: Never - Tobacco: Neve - Illicit drug: Denies - Herb and dietary Supplement: Denies  [Labs] 24 AST/ALT 43/54, AlkP 60. TB 0.6, Alb 4.2, Na 138, Ca 138, SCr 0.52. Total cholesterol 237, TGL 95,  CEA < 2 TSH 2.82  [Imaging] - US Abd 23: heterogeneous, which may obscure hepatic masses.  [Procedures] EGD 2023: wnl per pt Colonoscopy: will be scheduled Dr. Fernandes at

## 2024-07-29 NOTE — PHYSICAL EXAM
[Scleral Icterus] : No Scleral Icterus [Hepatojugular Reflux] : patient did not have a sustained hepatojugular reflux [Spider Angioma] : No spider angioma(s) were observed [Abdominal  Ascites] : no ascites [Splenomegaly] : no splenomegaly [Liver Palpable ___ Finger Breadths Below Costal Margin] : Liver edge was palpable [unfilled] finger breadths below costal margin [Non-Tender] : non-tender [Smooth] : smooth [Asterixis] : no asterixis observed [Jaundice] : No jaundice [Palmar Erythema] : no Palmar Erythema [General Appearance - Alert] : alert [General Appearance - In No Acute Distress] : in no acute distress [Oriented To Time, Place, And Person] : oriented to person, place, and time

## 2024-07-29 NOTE — HISTORY OF PRESENT ILLNESS
[FreeTextEntry1] : RFR: Elevated liver tests Referring MD: Dr. Brandee Bronson MD (Daughter), Lidia Bronson MD (Fax [phone])  Ms. Bronson is a 64y F with right breast cancer s/p mastectomy 2013 chemox6 2024 who is referred by PCP for evaluation of elevated liver enzymes. Pt also has PMHx of hypothyroidism, HLD, and Osteoporosis. Also followed by Dr. Morales Howard (cardiology) for hx of chest pain (managed w BB)-- 2019 w/u cardiac cath showed normal coronaries but was told she had hypokinesis on the LV gram. Echo shows normal wall motion.   Today she complains of poor appetite and weight loss. He denies n/v/d, fevers, chills, excessive fatigue, chest pain, SOB, palpitations, lightheadedness/ dizziness, melena, unintentional weight loss, acute hospitalizations or ER visits. Afebrile today. Appetite is good.  [Medical Hx] See HPI [Surgical Hx] mastectomy ,  [Allergies] None [FmH of liver disease] None  [Medication] Currently Synthroid 75mcg only Atorvastatin stopped 6 months ago  [Social Hx] swimming 3/week - Alcohol: Never - Tobacco: Neve - Illicit drug: Denies - Herb and dietary Supplement: Denies  [Labs] 24 AST/ALT 43/54, AlkP 60. TB 0.6, Alb 4.2, Na 138, Ca 138, SCr 0.52. Total cholesterol 237, TGL 95,  CEA < 2 TSH 2.82  [Imaging] - US Abd 23: heterogeneous, which may obscure hepatic masses.  [Procedures] EGD 2023: wnl per pt Colonoscopy: will be scheduled Dr. Fernandes at

## 2024-07-29 NOTE — ASSESSMENT
[FreeTextEntry1] : [Assessment]   64y F with right breast cancer s/p mastectomy 11/2013 chemox6 2/2024 who is referred by PCP for evaluation of elevated liver enzymes. Pt also has PMHx of hypothyroidism, HLD, and Osteoporosis.   [Plan]  # LFT elevation, Mild - US abdomen showed steatosis in 2023. - Labs today: GGT, TTG IGA, IGA, GRACE, AMA, ceruloplasmin, lipid panel, ferritin, iron saturation. - Will check CPK for muscle pain. - Screen for Hep A/B/C.  # Cardiology Hx # Muscle pain - Follow-up with Dr. Morales Howard for echocardiogram. - Will order Troponin and CPK-MB. - Need to rule out cardiac issues.  - Education and counseling were provided to the patient. - Discussed at length with the patient that next course of action would depend on results Fibroscan in a month US abd in a month RTO in a month (204 weeks)

## 2024-07-29 NOTE — REVIEW OF SYSTEMS
[Fever] : no fever [Chills] : no chills [Feeling Tired] : not feeling tired [Recent Weight Gain (___ Lbs)] : no recent weight gain [Recent Weight Loss (___ Lbs)] : no recent weight loss [Eye Pain] : no eye pain [Earache] : no earache [Heart Rate Is Slow] : the heart rate was not slow [Chest Pain] : no chest pain [Lower Ext Edema] : no lower extremity edema [Shortness Of Breath] : no shortness of breath [Cough] : no cough [Abdominal Pain] : no abdominal pain [Vomiting] : no vomiting [Constipation] : no constipation [Diarrhea] : no diarrhea [Melena] : no melena [Arthralgias] : arthralgias [Limb Swelling] : no limb swelling [Itching] : no itching [Dizziness] : no dizziness [Fainting] : no fainting [Anxiety] : no anxiety [Easy Bleeding] : no tendency for easy bleeding [Easy Bruising] : no tendency for easy bruising

## 2024-08-09 ENCOUNTER — APPOINTMENT (OUTPATIENT)
Dept: HEPATOLOGY | Facility: CLINIC | Age: 64
End: 2024-08-09

## 2024-08-09 PROCEDURE — 76981 USE PARENCHYMA: CPT | Mod: 26

## 2024-08-12 ENCOUNTER — APPOINTMENT (OUTPATIENT)
Dept: HEPATOLOGY | Facility: CLINIC | Age: 64
End: 2024-08-12
Payer: MEDICARE

## 2024-08-12 VITALS
TEMPERATURE: 96.3 F | WEIGHT: 92 LBS | HEIGHT: 63 IN | SYSTOLIC BLOOD PRESSURE: 151 MMHG | RESPIRATION RATE: 12 BRPM | BODY MASS INDEX: 16.3 KG/M2 | HEART RATE: 82 BPM | OXYGEN SATURATION: 98 % | DIASTOLIC BLOOD PRESSURE: 97 MMHG

## 2024-08-12 DIAGNOSIS — R76.8 OTHER SPECIFIED ABNORMAL IMMUNOLOGICAL FINDINGS IN SERUM: ICD-10-CM

## 2024-08-12 PROCEDURE — 99214 OFFICE O/P EST MOD 30 MIN: CPT

## 2024-08-13 PROBLEM — R76.8 ANA POSITIVE: Status: ACTIVE | Noted: 2024-08-13

## 2024-08-13 NOTE — ASSESSMENT
[FreeTextEntry1] : 64y F with hx of right breast cancer s/p mastectomy 11/2013 chemox6 2/2014 who was referred by PCP for evaluation of elevated liver enzymes. Additional PMHx of HLD, hypothyroidism, osteoporosis, LV hypokinesis as noted on 2019 cardiac cath w/u for c/o chest pain.  US ABD done 7/22/24: Mildly coarsened hepatic echotexture. The liver is otherwise unremarkable.  [Plan]  # LFT elevation, Mild - US abdomen showed steatosis . - extensive labs are unremarkable  # Cardiology Hx # Muscle pain - Follow-up with Dr. Morales Howard for echocardiogram. - Negative Troponin and CPK-MB.  - Education and counseling were provided to the patient. - Discussed at length with the patient that next course of action would depend on results  Labs in a year Fibroscan 8/9/24: 6.7kPa  S0 F0-1 Fibroscan on the same day of next appt.  RTO in a year  # Health Maintenance Immune to Hep A Non-Immune to Hep B (HBsAg- HBcAb- HBsAb+3) If pt is immunocompromised or high-risk for hep B, will give a booster shot series (double the dose vaccine or use Heplisav-B). Hepatitis C Negative

## 2024-08-13 NOTE — HISTORY OF PRESENT ILLNESS
[FreeTextEntry1] : RFR: Elevated liver tests Referring MD: Dr. Brandee Bronson MD (Daughter), Lidia Bronson MD (Fax [phone])  Ms. Bronson is a 64y F with right breast cancer s/p mastectomy 2013 chemox6  2014 who is referred by PCP for evaluation of elevated liver enzymes. Pt also has PMHx of hypothyroidism, HLD, and Osteoporosis. Also followed by Dr. Morales Howard (cardiology) for hx of chest pain (managed w BB) -- 2019 w/u cardiac cath showed normal coronaries but was told she had hypokinesis on the LV gram. Echo shows normal wall motion.  [Interim Hx] Fibroscan 24: 6.7kPa  S0 F0-1 US ABD done 24: Mildly coarsened hepatic echotexture. The liver is otherwise unremarkable. Labs done 24: INR 0.96, AST/ALT 36/26; ALKP 74; TB 0.4, SCr 0.56,  GGT 35, iron studies, ferritin WNL HbsAg -, HbcAb -. HCV nr, HAV IgG + AFP 3.2 CKMB 3, troponin WNL, ceruloplasmin 29, AMA < 20 GRACE 1:320  Today she complains of poor appetite and weight loss. She denies n/v/d, fevers, chills, excessive fatigue, chest pain, SOB, palpitations, lightheadedness/ dizziness, melena, unintentional weight loss, acute hospitalizations or ER visits. Afebrile today. Appetite is good.  [Medical Hx] See HPI [Surgical Hx] mastectomy ,  [Allergies] None [FmH of liver disease] None  [Medication] Currently Synthroid 75mcg only Atorvastatin stopped 2024.   [Social Hx] swimming 3/week - Alcohol: Never - Tobacco: Neve - Illicit drug: Denies - Herb and dietary Supplement: Denies  [Labs] 24 AST/ALT 43/54, AlkP 60. TB 0.6, Alb 4.2, Na 138, Ca 138, SCr 0.52. Total cholesterol 237, TGL 95,  CEA < 2 TSH 2.82  [Imaging] - US Abd 24: Mildly coarsened hepatic echotexture. The liver is otherwise unremarkable.  [Procedures] EGD 2023: wnl per pt Colonoscopy: will be scheduled Dr. Fernandes at

## 2024-08-13 NOTE — HISTORY OF PRESENT ILLNESS
[FreeTextEntry1] : RFR: Elevated liver tests Referring MD: Dr. Brandee Bronson MD (Daughter), Lidia Bronson MD (Fax [phone])  Ms. Bronson is a 64y F with right breast cancer s/p mastectomy 2013 chemox6  2014 who is referred by PCP for evaluation of elevated liver enzymes. Pt also has PMHx of hypothyroidism, HLD, and Osteoporosis. Also followed by Dr. Morales Howard (cardiology) for hx of chest pain (managed w BB) -- 2019 w/u cardiac cath showed normal coronaries but was told she had hypokinesis on the LV gram. Echo shows normal wall motion.  [Interim Hx] Fibroscan 24: 6.7kPa  S0 F0-1 US ABD done 24: Mildly coarsened hepatic echotexture. The liver is otherwise unremarkable. Labs done 24: INR 0.96, AST/ALT 36/26; ALKP 74; TB 0.4, SCr 0.56,  GGT 35, iron studies, ferritin WNL HbsAg -, HbcAb -. HCV nr, HAV IgG + AFP 3.2 CKMB 3, troponin WNL, ceruloplasmin 29, AMA < 20 GRACE 1:320  Today she complains of poor appetite and weight loss. She denies n/v/d, fevers, chills, excessive fatigue, chest pain, SOB, palpitations, lightheadedness/ dizziness, melena, unintentional weight loss, acute hospitalizations or ER visits. Afebrile today. Appetite is good.  [Medical Hx] See HPI [Surgical Hx] mastectomy ,  [Allergies] None [FmH of liver disease] None  [Medication] Currently Synthroid 75mcg only Atorvastatin stopped 2024.   [Social Hx] swimming 3/week - Alcohol: Never - Tobacco: Neve - Illicit drug: Denies - Herb and dietary Supplement: Denies  [Labs] 24 AST/ALT 43/54, AlkP 60. TB 0.6, Alb 4.2, Na 138, Ca 138, SCr 0.52. Total cholesterol 237, TGL 95,  CEA < 2 TSH 2.82  [Imaging] - US Abd 24: Mildly coarsened hepatic echotexture. The liver is otherwise unremarkable.  [Procedures] EGD 2023: wnl per pt Colonoscopy: will be scheduled Dr. Fernandes at

## 2024-08-13 NOTE — PHYSICAL EXAM
[Liver Palpable ___ Finger Breadths Below Costal Margin] : Liver edge was palpable [unfilled] finger breadths below costal margin [Non-Tender] : non-tender [Smooth] : smooth [General Appearance - Alert] : alert [General Appearance - In No Acute Distress] : in no acute distress [Oriented To Time, Place, And Person] : oriented to person, place, and time [Scleral Icterus] : No Scleral Icterus [Hepatojugular Reflux] : patient did not have a sustained hepatojugular reflux [Spider Angioma] : No spider angioma(s) were observed [Abdominal  Ascites] : no ascites [Splenomegaly] : no splenomegaly [Asterixis] : no asterixis observed [Jaundice] : No jaundice [Palmar Erythema] : no Palmar Erythema

## 2024-08-13 NOTE — REVIEW OF SYSTEMS
[Arthralgias] : arthralgias [Fever] : no fever [Chills] : no chills [Feeling Tired] : not feeling tired [Recent Weight Gain (___ Lbs)] : no recent weight gain [Recent Weight Loss (___ Lbs)] : no recent weight loss [Eye Pain] : no eye pain [Earache] : no earache [Heart Rate Is Slow] : the heart rate was not slow [Chest Pain] : no chest pain [Lower Ext Edema] : no lower extremity edema [Shortness Of Breath] : no shortness of breath [Cough] : no cough [Abdominal Pain] : no abdominal pain [Vomiting] : no vomiting [Constipation] : no constipation [Diarrhea] : no diarrhea [Melena] : no melena [Limb Swelling] : no limb swelling [Itching] : no itching [Dizziness] : no dizziness [Fainting] : no fainting [Anxiety] : no anxiety [Easy Bleeding] : no tendency for easy bleeding [Easy Bruising] : no tendency for easy bruising

## 2024-08-13 NOTE — HISTORY OF PRESENT ILLNESS
[FreeTextEntry1] : RFR: Elevated liver tests Referring MD: Dr. Brandee Bronson MD (Daughter), Lidia Bronson MD (Fax [phone])  Ms. Bronson is a 64y F with right breast cancer s/p mastectomy 2013 chemox6  2014 who is referred by PCP for evaluation of elevated liver enzymes. Pt also has PMHx of hypothyroidism, HLD, and Osteoporosis. Also followed by Dr. Morales Howard (cardiology) for hx of chest pain (managed w BB) -- 2019 w/u cardiac cath showed normal coronaries but was told she had hypokinesis on the LV gram. Echo shows normal wall motion.  [Interim Hx] Fibroscan 24: 6.7kPa  S0 F0-1 US ABD done 24: Mildly coarsened hepatic echotexture. The liver is otherwise unremarkable. Labs done 24: INR 0.96, AST/ALT 36/26; ALKP 74; TB 0.4, SCr 0.56,  GGT 35, iron studies, ferritin WNL HbsAg -, HbcAb -. HCV nr, HAV IgG + AFP 3.2 CKMB 3, troponin WNL, ceruloplasmin 29, AMA < 20 GRACE 1:320  Today she complains of poor appetite and weight loss. She denies n/v/d, fevers, chills, excessive fatigue, chest pain, SOB, palpitations, lightheadedness/ dizziness, melena, unintentional weight loss, acute hospitalizations or ER visits. Afebrile today. Appetite is good.  [Medical Hx] See HPI [Surgical Hx] mastectomy ,  [Allergies] None [FmH of liver disease] None  [Medication] Currently Synthroid 75mcg only Atorvastatin stopped 2024.   [Social Hx] swimming 3/week - Alcohol: Never - Tobacco: Neve - Illicit drug: Denies - Herb and dietary Supplement: Denies  [Labs] 24 AST/ALT 43/54, AlkP 60. TB 0.6, Alb 4.2, Na 138, Ca 138, SCr 0.52. Total cholesterol 237, TGL 95,  CEA < 2 TSH 2.82  [Imaging] - US Abd 24: Mildly coarsened hepatic echotexture. The liver is otherwise unremarkable.  [Procedures] EGD 2023: wnl per pt Colonoscopy: will be scheduled Dr. Fernandes at

## 2024-08-17 ENCOUNTER — TRANSCRIPTION ENCOUNTER (OUTPATIENT)
Age: 64
End: 2024-08-17

## 2024-08-18 LAB
IGG SER QL IEP: 1196 MG/DL
SMOOTH MUSCLE AB SER QL IF: NORMAL

## 2025-02-24 ENCOUNTER — APPOINTMENT (OUTPATIENT)
Dept: HEPATOLOGY | Facility: CLINIC | Age: 65
End: 2025-02-24

## 2025-03-10 ENCOUNTER — APPOINTMENT (OUTPATIENT)
Dept: HEPATOLOGY | Facility: CLINIC | Age: 65
End: 2025-03-10
Payer: MEDICARE

## 2025-03-10 VITALS — HEIGHT: 63 IN | BODY MASS INDEX: 17.19 KG/M2 | WEIGHT: 97 LBS

## 2025-03-10 DIAGNOSIS — R74.8 ABNORMAL LEVELS OF OTHER SERUM ENZYMES: ICD-10-CM

## 2025-03-10 DIAGNOSIS — R76.8 OTHER SPECIFIED ABNORMAL IMMUNOLOGICAL FINDINGS IN SERUM: ICD-10-CM

## 2025-03-10 DIAGNOSIS — K76.0 FATTY (CHANGE OF) LIVER, NOT ELSEWHERE CLASSIFIED: ICD-10-CM

## 2025-03-10 PROCEDURE — 99214 OFFICE O/P EST MOD 30 MIN: CPT

## 2025-04-29 ENCOUNTER — NON-APPOINTMENT (OUTPATIENT)
Age: 65
End: 2025-04-29

## 2025-09-15 ENCOUNTER — APPOINTMENT (OUTPATIENT)
Dept: HEPATOLOGY | Facility: CLINIC | Age: 65
End: 2025-09-15

## 2025-09-15 ENCOUNTER — APPOINTMENT (OUTPATIENT)
Dept: HEPATOLOGY | Facility: CLINIC | Age: 65
End: 2025-09-15
Payer: MEDICAID

## 2025-09-15 DIAGNOSIS — K76.0 FATTY (CHANGE OF) LIVER, NOT ELSEWHERE CLASSIFIED: ICD-10-CM

## 2025-09-15 PROCEDURE — 76981 USE PARENCHYMA: CPT
